# Patient Record
Sex: MALE | Race: WHITE | NOT HISPANIC OR LATINO | URBAN - METROPOLITAN AREA
[De-identification: names, ages, dates, MRNs, and addresses within clinical notes are randomized per-mention and may not be internally consistent; named-entity substitution may affect disease eponyms.]

---

## 2017-01-31 ENCOUNTER — OUTPATIENT (OUTPATIENT)
Dept: OUTPATIENT SERVICES | Facility: HOSPITAL | Age: 62
LOS: 1 days | End: 2017-01-31

## 2019-05-10 ENCOUNTER — OUTPATIENT (OUTPATIENT)
Dept: OUTPATIENT SERVICES | Facility: HOSPITAL | Age: 64
LOS: 1 days | End: 2019-05-10

## 2019-07-22 PROBLEM — Z00.00 ENCOUNTER FOR PREVENTIVE HEALTH EXAMINATION: Status: ACTIVE | Noted: 2019-07-22

## 2019-07-26 ENCOUNTER — APPOINTMENT (OUTPATIENT)
Dept: ULTRASOUND IMAGING | Facility: CLINIC | Age: 64
End: 2019-07-26
Payer: COMMERCIAL

## 2019-07-26 PROCEDURE — 76700 US EXAM ABDOM COMPLETE: CPT

## 2019-09-11 ENCOUNTER — APPOINTMENT (OUTPATIENT)
Dept: CARDIOLOGY | Facility: CLINIC | Age: 64
End: 2019-09-11

## 2019-09-11 ENCOUNTER — OUTPATIENT (OUTPATIENT)
Dept: OUTPATIENT SERVICES | Facility: HOSPITAL | Age: 64
LOS: 1 days | End: 2019-09-11
Payer: COMMERCIAL

## 2019-09-11 DIAGNOSIS — R94.39 ABNORMAL RESULT OF OTHER CARDIOVASCULAR FUNCTION STUDY: ICD-10-CM

## 2019-09-11 PROCEDURE — 75574 CT ANGIO HRT W/3D IMAGE: CPT

## 2019-09-11 PROCEDURE — 75574 CT ANGIO HRT W/3D IMAGE: CPT | Mod: 26

## 2019-10-04 ENCOUNTER — APPOINTMENT (OUTPATIENT)
Dept: PULMONOLOGY | Facility: CLINIC | Age: 64
End: 2019-10-04
Payer: COMMERCIAL

## 2019-10-04 PROCEDURE — 95070 INHLJ BRNCL CHALLENGE TSTG: CPT

## 2019-10-04 PROCEDURE — 94070 EVALUATION OF WHEEZING: CPT

## 2021-05-10 ENCOUNTER — OUTPATIENT (OUTPATIENT)
Dept: OUTPATIENT SERVICES | Facility: HOSPITAL | Age: 66
LOS: 1 days | End: 2021-05-10

## 2024-06-04 ENCOUNTER — APPOINTMENT (OUTPATIENT)
Dept: PULMONOLOGY | Facility: CLINIC | Age: 69
End: 2024-06-04
Payer: COMMERCIAL

## 2024-06-04 VITALS
DIASTOLIC BLOOD PRESSURE: 70 MMHG | SYSTOLIC BLOOD PRESSURE: 118 MMHG | HEART RATE: 58 BPM | WEIGHT: 200 LBS | TEMPERATURE: 98 F | HEIGHT: 70 IN | BODY MASS INDEX: 28.63 KG/M2 | OXYGEN SATURATION: 98 %

## 2024-06-04 DIAGNOSIS — Z86.79 PERSONAL HISTORY OF OTHER DISEASES OF THE CIRCULATORY SYSTEM: ICD-10-CM

## 2024-06-04 DIAGNOSIS — Z78.9 OTHER SPECIFIED HEALTH STATUS: ICD-10-CM

## 2024-06-04 DIAGNOSIS — Z87.438 PERSONAL HISTORY OF OTHER DISEASES OF MALE GENITAL ORGANS: ICD-10-CM

## 2024-06-04 DIAGNOSIS — Z86.59 PERSONAL HISTORY OF OTHER MENTAL AND BEHAVIORAL DISORDERS: ICD-10-CM

## 2024-06-04 DIAGNOSIS — Z87.09 PERSONAL HISTORY OF OTHER DISEASES OF THE RESPIRATORY SYSTEM: ICD-10-CM

## 2024-06-04 DIAGNOSIS — Z86.39 PERSONAL HISTORY OF OTHER ENDOCRINE, NUTRITIONAL AND METABOLIC DISEASE: ICD-10-CM

## 2024-06-04 DIAGNOSIS — J68.3 OTHER ACUTE AND SUBACUTE RESPIRATORY CONDITIONS DUE TO CHEMICALS, GASES, FUMES AND VAPORS: ICD-10-CM

## 2024-06-04 PROCEDURE — 94010 BREATHING CAPACITY TEST: CPT

## 2024-06-04 PROCEDURE — ZZZZZ: CPT

## 2024-06-04 PROCEDURE — 94729 DIFFUSING CAPACITY: CPT

## 2024-06-04 PROCEDURE — 94727 GAS DIL/WSHOT DETER LNG VOL: CPT

## 2024-06-04 PROCEDURE — 99204 OFFICE O/P NEW MOD 45 MIN: CPT | Mod: 25

## 2024-06-04 RX ORDER — ATENOLOL 50 MG/1
TABLET ORAL
Refills: 0 | Status: ACTIVE | COMMUNITY

## 2024-06-04 RX ORDER — ALBUTEROL SULFATE 90 UG/1
108 (90 BASE) AEROSOL, METERED RESPIRATORY (INHALATION)
Refills: 0 | Status: ACTIVE | COMMUNITY

## 2024-06-04 RX ORDER — TAMSULOSIN HYDROCHLORIDE 0.4 MG/1
0.4 CAPSULE ORAL
Refills: 0 | Status: ACTIVE | COMMUNITY

## 2024-06-04 RX ORDER — DEXTROAMPHETAMINE SULFATE 15 MG/1
15 CAPSULE, EXTENDED RELEASE ORAL
Refills: 0 | Status: ACTIVE | COMMUNITY

## 2024-06-04 RX ORDER — FLUOXETINE HYDROCHLORIDE 20 MG/1
20 CAPSULE ORAL
Refills: 0 | Status: ACTIVE | COMMUNITY

## 2024-06-04 RX ORDER — FENOFIBRATE 120 MG/1
TABLET ORAL
Refills: 0 | Status: ACTIVE | COMMUNITY

## 2024-06-04 NOTE — HISTORY OF PRESENT ILLNESS
[Never] : never [TextBox_4] : 68 male no hx tobacco  Pt presents today bec hx asthma  and [place] last steroid use few yrs  at time of cough from [place] and on inhaler as well NO ED visit today feels good  no sob no cough no wheeze no chest pain no tightness notes some sob and wheeze with exercise renetta stairs precipitating factors humid and cold air lives with dog  no cat no bird  ret [employer]

## 2024-06-04 NOTE — DISCUSSION/SUMMARY
[FreeTextEntry1] : Mr. Diana carrero has reactive airways disease syndrome from exposure to the [place].  Overall he is doing well and is not on any maintenance therapy.  I discussed with him that Ventolin can be used on an as-needed basis and 15 minutes before any activity known to bring on the symptoms.  He understands and agrees.  We will get a baseline chest x-ray at this time. The patient understands and agrees with plan of care. Today's office visit encompassed 46 minutes. I conducted an extensive history,physical exam and reviewed diagnosis and treatment options including diagnostic tests,radiology studies including cat scans and the use of prescription medication.

## 2024-06-04 NOTE — REASON FOR VISIT
[Initial] : an initial visit [Chest Pain] : chest pain [Asthma] : asthma [Cough] : cough [Shortness of Breath] : shortness of breath [Wheezing] : wheezing [TextEntry] : Patient here for asthma related to [place]. Patient states he has SOB with activity, mostly dry cough, occasional chest pain and wheezing. Patient had PFT today.

## 2024-06-04 NOTE — PROCEDURE
[FreeTextEntry1] : Pulmonary function test performed 6/4/2024 no obstructive or restrictive lung disease.

## 2024-06-06 ENCOUNTER — NON-APPOINTMENT (OUTPATIENT)
Age: 69
End: 2024-06-06

## 2024-12-03 ENCOUNTER — APPOINTMENT (OUTPATIENT)
Dept: PULMONOLOGY | Facility: CLINIC | Age: 69
End: 2024-12-03

## 2024-12-03 VITALS
WEIGHT: 190 LBS | SYSTOLIC BLOOD PRESSURE: 122 MMHG | HEART RATE: 66 BPM | BODY MASS INDEX: 27.2 KG/M2 | HEIGHT: 70 IN | DIASTOLIC BLOOD PRESSURE: 74 MMHG | OXYGEN SATURATION: 98 %

## 2024-12-03 DIAGNOSIS — J68.3 OTHER ACUTE AND SUBACUTE RESPIRATORY CONDITIONS DUE TO CHEMICALS, GASES, FUMES AND VAPORS: ICD-10-CM

## 2024-12-03 PROCEDURE — 99214 OFFICE O/P EST MOD 30 MIN: CPT

## 2025-04-29 ENCOUNTER — NON-APPOINTMENT (OUTPATIENT)
Age: 70
End: 2025-04-29

## 2025-05-01 ENCOUNTER — APPOINTMENT (OUTPATIENT)
Dept: PULMONOLOGY | Facility: CLINIC | Age: 70
End: 2025-05-01
Payer: COMMERCIAL

## 2025-05-01 VITALS
HEIGHT: 70 IN | HEART RATE: 61 BPM | BODY MASS INDEX: 27.2 KG/M2 | SYSTOLIC BLOOD PRESSURE: 114 MMHG | WEIGHT: 190 LBS | DIASTOLIC BLOOD PRESSURE: 56 MMHG | OXYGEN SATURATION: 95 % | TEMPERATURE: 97.5 F

## 2025-05-01 DIAGNOSIS — J68.3 OTHER ACUTE AND SUBACUTE RESPIRATORY CONDITIONS DUE TO CHEMICALS, GASES, FUMES AND VAPORS: ICD-10-CM

## 2025-05-01 PROCEDURE — 99214 OFFICE O/P EST MOD 30 MIN: CPT | Mod: 25

## 2025-05-01 PROCEDURE — 94729 DIFFUSING CAPACITY: CPT

## 2025-05-01 PROCEDURE — 94060 EVALUATION OF WHEEZING: CPT

## 2025-05-01 PROCEDURE — ZZZZZ: CPT

## 2025-05-01 PROCEDURE — 94727 GAS DIL/WSHOT DETER LNG VOL: CPT

## 2025-05-01 RX ORDER — FLUTICASONE FUROATE AND VILANTEROL TRIFENATATE 100; 25 UG/1; UG/1
100-25 POWDER RESPIRATORY (INHALATION)
Qty: 1 | Refills: 6 | Status: ACTIVE | COMMUNITY
Start: 2025-05-01 | End: 1900-01-01

## 2025-08-01 ENCOUNTER — APPOINTMENT (OUTPATIENT)
Dept: ORTHOPEDIC SURGERY | Facility: CLINIC | Age: 70
End: 2025-08-01
Payer: MEDICARE

## 2025-08-01 DIAGNOSIS — M75.111 INCOMPLETE ROTATOR CUFF TEAR OR RUPTURE OF RIGHT SHOULDER, NOT SPECIFIED AS TRAUMATIC: ICD-10-CM

## 2025-08-01 PROCEDURE — 99204 OFFICE O/P NEW MOD 45 MIN: CPT | Mod: 25

## 2025-08-01 PROCEDURE — 20610 DRAIN/INJ JOINT/BURSA W/O US: CPT | Mod: RT

## 2025-08-01 PROCEDURE — 73030 X-RAY EXAM OF SHOULDER: CPT | Mod: RT

## 2025-09-12 ENCOUNTER — APPOINTMENT (OUTPATIENT)
Dept: ORTHOPEDIC SURGERY | Facility: CLINIC | Age: 70
End: 2025-09-12
Payer: MEDICARE

## 2025-09-12 DIAGNOSIS — M75.111 INCOMPLETE ROTATOR CUFF TEAR OR RUPTURE OF RIGHT SHOULDER, NOT SPECIFIED AS TRAUMATIC: ICD-10-CM

## 2025-09-12 PROCEDURE — 99213 OFFICE O/P EST LOW 20 MIN: CPT
